# Patient Record
(demographics unavailable — no encounter records)

---

## 2025-03-05 NOTE — OB HISTORY
[Total Preg ___] : : [unfilled] [ ___] : [unfilled]  section delivery(s) [Definite ___ (Date)] : the last menstrual period was [unfilled]

## 2025-03-05 NOTE — PROCEDURE
[Colposcopy] : colposcopy [Abnormal Pap] : an abnormal pap smear [LGSIL] : low grade squamous intraepithelial lesion [Patient] : the patient [Written Consent] : written consent was obtained prior to the procedure and is detailed in the patient's record [Acetowhite ___ o'clock] : ascetowhite changes at the [unfilled] ~Uo'clock position [Atypical Vessels ___ o'clock] : atypical vessels at the [unfilled] ~Uo'clock position [Punctation ___ o'clock] : punctation at the [unfilled] ~Uo'clock position [No Abnormalities] : no abnormalities seen [Biopsies Taken: # ___] : [unfilled] biopsies taken of the cervix [ECC Done] : an Endocervical curettage was performed.  [Direct Pressure] : direct pressure [No Complications] : none [Tolerated Well] : the patient tolerated the procedure well [0] : 0

## 2025-03-05 NOTE — DISCUSSION/SUMMARY
[Reviewed Clinical Lab Test(s)] : Results of clinical tests were reviewed. [Discuss Alternatives/Risks/Benefits w/Patient] : All alternatives, risks, and benefits were discussed with the patient/family and all questions were answered.  Patient expressed good understanding and appreciates the importance of follow up as recommended. [Visit Time ___ Minutes] : [unfilled] minutes [FreeTextEntry1] : 66 yo with LSIL   Cervical dysplasia discussed with patient, natural history and risk of progression discussed HPV association was discussed with patient Sometimes HPV can clear on its own, cytologic abnormalities may lag behind Also possible that HPV was not detected and dormant at this time.   Colpo performed, would only do excision if HSIL  Otherwise would repeat pap in 1 yr  Aracelis Berry MD, FACOG  Gynecologic Oncology

## 2025-03-05 NOTE — HISTORY OF PRESENT ILLNESS
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   2/12/25 LSIL HPV not detected   10/2022 NILM HRHPV+  6/2021 ASCUS HRHPV+ Colpo 2021 Benign   HCM: Pap: 2025 as above Mammo:2024 C-scope: 2023 PMH: HTN, HLD, osteoprosis, PreDM PSH: Denies Gyn Hx:  at 49, Denies history of ovarian cysts, fibroids, endometriosis, abnormal pap, pelvic infections  Ob Hx:  TOPx1 Meds: Losartan, HCTZ, Statin, Prolia, MVI Allergies: NKDA FH: Denies FH of malignancy SH: Never smoker, occ etoh, no drugs , not sexually active, works as